# Patient Record
Sex: FEMALE | Race: WHITE | Employment: FULL TIME | URBAN - METROPOLITAN AREA
[De-identification: names, ages, dates, MRNs, and addresses within clinical notes are randomized per-mention and may not be internally consistent; named-entity substitution may affect disease eponyms.]

---

## 2017-01-25 DIAGNOSIS — F41.1 GENERALIZED ANXIETY DISORDER: ICD-10-CM

## 2017-01-25 RX ORDER — CITALOPRAM 10 MG/1
TABLET ORAL
Qty: 90 TAB | Refills: 2 | Status: SHIPPED | OUTPATIENT
Start: 2017-01-25 | End: 2017-09-22 | Stop reason: SDUPTHER

## 2017-02-01 ENCOUNTER — OFFICE VISIT (OUTPATIENT)
Dept: OBGYN CLINIC | Age: 52
End: 2017-02-01

## 2017-02-01 ENCOUNTER — HOSPITAL ENCOUNTER (OUTPATIENT)
Dept: LAB | Age: 52
Discharge: HOME | End: 2017-02-01
Payer: OTHER GOVERNMENT

## 2017-02-01 VITALS
SYSTOLIC BLOOD PRESSURE: 128 MMHG | HEART RATE: 70 BPM | WEIGHT: 120 LBS | BODY MASS INDEX: 21.26 KG/M2 | DIASTOLIC BLOOD PRESSURE: 84 MMHG | HEIGHT: 63 IN

## 2017-02-01 DIAGNOSIS — Z01.419 ENCOUNTER FOR GYNECOLOGICAL EXAMINATION WITHOUT ABNORMAL FINDING: Primary | ICD-10-CM

## 2017-02-01 LAB
BILIRUB UR QL STRIP: NEGATIVE
GLUCOSE UR-MCNC: NEGATIVE MG/DL
KETONES P FAST UR STRIP-MCNC: NEGATIVE MG/DL
PH UR STRIP: 7.5 [PH] (ref 4.6–8)
PROT UR QL STRIP: NEGATIVE MG/DL
SP GR UR STRIP: 1.02 (ref 1–1.03)
UA UROBILINOGEN AMB POC: NORMAL (ref 0.2–1)
URINALYSIS CLARITY POC: CLEAR
URINALYSIS COLOR POC: YELLOW
URINE BLOOD POC: NORMAL
URINE LEUKOCYTES POC: NEGATIVE
URINE NITRITES POC: NEGATIVE

## 2017-02-01 PROCEDURE — 87624 HPV HI-RISK TYP POOLED RSLT: CPT | Performed by: OBSTETRICS & GYNECOLOGY

## 2017-02-01 PROCEDURE — 88175 CYTOPATH C/V AUTO FLUID REDO: CPT | Performed by: OBSTETRICS & GYNECOLOGY

## 2017-02-01 NOTE — MR AVS SNAPSHOT
Visit Information Date & Time Provider Department Dept. Phone Encounter #  
 2/1/2017  9:00 1024 [facility], [address] [place] [address] [phone] [id number] Upcoming Health Maintenance Date Due Hepatitis C Screening 1965 COLONOSCOPY 5/26/1981 BREAST CANCER SCRN MAMMOGRAM 5/26/2013 PAP AKA CERVICAL CYTOLOGY 7/18/2016 Allergies as of 2/1/2017  Review Complete On: 2/1/2017 By: Oleg Avalos DO Severity Noted Reaction Type Reactions Amoxicillin  07/18/2013   Side Effect Nausea and Vomiting Codeine  07/18/2013   Side Effect Rash Percodan [Oxycodone Hcl-oxycodone-asa]  07/18/2013   Side Effect Itching Vicodin [Hydrocodone-acetaminophen]  07/18/2013   Side Effect Itching Current Immunizations  Reviewed on 8/29/2016 Name Date Influenza Vaccine 10/20/2014  9:00 AM  
 Influenza Vaccine (Quad) PF 8/29/2016 Pneumococcal Conjugate (PCV-13) 8/29/2016 Not reviewed this visit You Were Diagnosed With   
  
 Codes Comments Encounter for gynecological examination without abnormal finding    -  Primary ICD-10-CM: F87.984 ICD-9-CM: V72.31 Vitals BP Pulse Height(growth percentile) Weight(growth percentile) LMP BMI  
 128/84 70 5' 3\" (1.6 m) 120 lb (54.4 kg) 01/04/2016 21.26 kg/m2 OB Status Smoking Status Unknown Never Smoker BMI and BSA Data Body Mass Index Body Surface Area  
 21.26 kg/m 2 1.56 m 2 Preferred Pharmacy Pharmacy Name Phone [facility] 5454 [phone] Your Updated Medication List  
  
   
This list is accurate as of: 2/1/17  9:55 AM.  Always use your most recent med list. ADVIL 200 mg tablet Generic drug:  ibuprofen Take  by mouth. cetirizine 10 mg tablet Commonly known as:  ZYRTEC Take 1 Tab by mouth daily. cholecalciferol (VITAMIN D3) 5,000 unit Tab tablet Commonly known as:  VITAMIN D3 Take  by mouth daily. citalopram 10 mg tablet Commonly known as:  CELEXA  
TAKE 1 TABLET DAILY * estrogen (conjugated)-medroxyPROGESTERone 0.625-2.5 mg per tablet Commonly known as:  Tio Soja Take 1 Tab by mouth daily. * estrogen (conjugated)-medroxyPROGESTERone 0.625-2.5 mg per tablet Commonly known as:  Tio Soja Take 1 Tab by mouth daily. multivitamin with iron tablet Take 1 Tab by mouth daily. PROAIR HFA 90 mcg/actuation inhaler Generic drug:  albuterol PROBIOTIC 4X 10-15 mg Tbec Generic drug:  B.infantis-B.ani-B.long-B.bifi Take  by mouth. * Notice: This list has 2 medication(s) that are the same as other medications prescribed for you. Read the directions carefully, and ask your doctor or other care provider to review them with you. Prescriptions Sent to Pharmacy Refills  
 estrogen, conjugated,-medroxyPROGESTERone (PREMPRO) 0.625-2.5 mg per tablet 3 Sig: Take 1 Tab by mouth daily. Class: Normal  
 Pharmacy: [place] [id number] Ph #: [phone] Route: Oral  
 estrogen, conjugated,-medroxyPROGESTERone (PREMPRO) 0.625-2.5 mg per tablet 0 Sig: Take 1 Tab by mouth daily. Class: Normal  
 Pharmacy: [place] [id number] Ph #: [phone] Route: Oral  
  
To-Do List   
 02/01/2017 Imaging:  ROC MAMMO BI SCREENING INCL CAD Introducing [facility]! Dear Laci Cortes: Thank you for requesting a [employer] account. Our records indicate that you already have an active [employer] account. You can access your account anytime at [email or web address] Did you know that you can access your hospital and ER discharge instructions at any time in [employer]? You can also review all of your test results from your hospital stay or ER visit. Additional Information If you have questions, please visit the Frequently Asked Questions section of the [employer] website at [email or web address] Remember, [email or web address] is NOT to be used for urgent needs. For medical emergencies, dial 911. Now available from your iPhone and Android! Please provide this summary of care documentation to your next provider. Your primary care clinician is listed as Ade Forman. If you have any questions after today's visit, please call [phone].

## 2017-02-01 NOTE — PROGRESS NOTES
Subjective:   46 y.o. female for Well Woman Check. Patient's last menstrual period was 2016. Social History: single partner, contraception - none. Pertinent past medical hstory: no history of HTN, DVT, CAD, DM, liver disease, migraines or smoking. Current Outpatient Prescriptions   Medication Sig Dispense Refill    citalopram (CELEXA) 10 mg tablet TAKE 1 TABLET DAILY 90 Tab 2    cetirizine (ZYRTEC) 10 mg tablet Take 1 Tab by mouth daily. 30 Tab 1    PROAIR HFA 90 mcg/actuation inhaler   2    estrogen, conjugated,-medroxyPROGESTERone (PREMPHASE) 0.625 mg (14)/ 0.625mg-5mg(14) per tablet Take 1 Tab by mouth daily. 1 Package 12    cholecalciferol, VITAMIN D3, (VITAMIN D3) 5,000 unit tab tablet Take  by mouth daily.  ibuprofen (ADVIL) 200 mg tablet Take  by mouth.  multivitamin with iron tablet Take 1 Tab by mouth daily.  B.infantis-B.ani-B.long-B.bifi (PROBIOTIC 4X) 10-15 mg TbEC Take  by mouth. Allergies   Allergen Reactions    Amoxicillin Nausea and Vomiting    Codeine Rash    Percodan [Oxycodone Hcl-Oxycodone-Asa] Itching    Vicodin [Hydrocodone-Acetaminophen] Itching     Past Medical History   Diagnosis Date    Graves disease      Past Surgical History   Procedure Laterality Date    Pr abdomen surgery proc unlisted      Endoscopy, colon, diagnostic  2013    Hx  section  ,     Hx dilation and curettage  [id number]     8 miscarriages    Hx pelvic laparoscopy  [id number]     fertitliy    Hx  section       Family History   Problem Relation Age of Onset    SLE Mother     Hypertension Father     High Cholesterol Father      Social History   Substance Use Topics    Smoking status: Never Smoker    Smokeless tobacco: Never Used    Alcohol use Yes      Comment: 2 glasses wine twice a month        ROS:  Feeling well. No dyspnea or chest pain on exertion. No abdominal pain, change in bowel habits, black or bloody stools.   No urinary tract symptoms. GYN ROS: no breast pain or new or enlarging lumps on self exam, no side effects of hormonal medications, no discharge or pelvic pain, no hot flashes. No neurological complaints. Objective:     Visit Vitals    /84    Pulse 70    Ht 5' 3\" (1.6 m)    Wt 120 lb (54.4 kg)    LMP 01/04/2016    BMI 21.26 kg/m2     The patient appears well, alert, oriented x 3, in no distress. ENT normal.  Neck supple. No adenopathy or thyromegaly. AMALIA. Lungs are clear, good air entry, no wheezes, rhonchi or rales. S1 and S2 normal, no murmurs, regular rate and rhythm. Abdomen soft without tenderness, guarding, mass or organomegaly. Extremities show no edema, normal peripheral pulses. Neurological is normal, no focal findings.     BREAST EXAM: breasts appear normal, no suspicious masses, no skin or nipple changes or axillary nodes    PELVIC EXAM: normal external genitalia, vulva, vagina, cervix, uterus and adnexa, PAP: Pap smear done today, HPV test    Assessment/Plan:   well woman  no contraindication to continue hormonal therapy  mammogram  pap smear  return annually or prn

## 2017-02-15 DIAGNOSIS — J45.20 REACTIVE AIRWAY DISEASE, MILD INTERMITTENT, UNCOMPLICATED: ICD-10-CM

## 2017-02-15 RX ORDER — CETIRIZINE HCL 10 MG
10 TABLET ORAL DAILY
Qty: 30 TAB | Refills: 1 | Status: SHIPPED | OUTPATIENT
Start: 2017-02-15 | End: 2017-04-04 | Stop reason: SDUPTHER

## 2017-03-02 ENCOUNTER — TELEPHONE (OUTPATIENT)
Dept: FAMILY MEDICINE CLINIC | Age: 52
End: 2017-03-02

## 2017-04-03 ENCOUNTER — TELEPHONE (OUTPATIENT)
Dept: OBGYN CLINIC | Age: 52
End: 2017-04-03

## 2017-04-03 NOTE — TELEPHONE ENCOUNTER
LM for patient Dr. Olivares Beat called pharmacy and she has refills they just have to order more tonight so she can  tomorrow.

## 2017-04-03 NOTE — TELEPHONE ENCOUNTER
Patient experiencing hot flashes. She is out of her medication. [facility].  Patient states her meds were [facility] because [facility]'t [facility] the [facility].

## 2017-04-03 NOTE — TELEPHONE ENCOUNTER
Patient stated her prescription isn't at [facility] when [facility] changed it they didn't do it for the a year just for that one month

## 2017-04-07 ENCOUNTER — HOSPITAL ENCOUNTER (OUTPATIENT)
Dept: MAMMOGRAPHY | Age: 52
Discharge: HOME | End: 2017-04-07
Attending: OBSTETRICS & GYNECOLOGY
Payer: OTHER GOVERNMENT

## 2017-04-07 DIAGNOSIS — Z01.419 ENCOUNTER FOR GYNECOLOGICAL EXAMINATION WITHOUT ABNORMAL FINDING: ICD-10-CM

## 2017-04-07 PROCEDURE — 77067 SCR MAMMO BI INCL CAD: CPT

## 2017-09-22 DIAGNOSIS — F41.1 GENERALIZED ANXIETY DISORDER: ICD-10-CM

## 2017-09-22 RX ORDER — CITALOPRAM 10 MG/1
TABLET ORAL
Qty: 90 TAB | Refills: 2 | Status: SHIPPED | OUTPATIENT
Start: 2017-09-22

## 2017-09-26 ENCOUNTER — CLINICAL SUPPORT (OUTPATIENT)
Dept: FAMILY MEDICINE CLINIC | Age: 52
End: 2017-09-26

## 2017-09-26 VITALS — HEIGHT: 63 IN

## 2017-09-26 DIAGNOSIS — Z23 ENCOUNTER FOR IMMUNIZATION: Primary | ICD-10-CM

## 2017-09-26 NOTE — PROGRESS NOTES
Andre Stauffer is a 45 y.o. female who presents for routine immunizations. She denies any symptoms , reactions or allergies that would exclude them from being immunized today. Risks and adverse reactions were discussed and the VIS was given to them. All questions were addressed. She was observed for 15 min post injection. There were no reactions observed.     Verbal order with read back per Dr. Dave Paniagua request    Cameron Devi LPN

## 2017-09-26 NOTE — MR AVS SNAPSHOT
Visit Information Date & Time Provider Department Dept. Phone Encounter #  
 9/26/2017  3:45 PM [facility], [address] [place] [id number] Follow-up Instructions Return if symptoms worsen or fail to improve. Follow-up and Disposition History Upcoming Health Maintenance Date Due Hepatitis C Screening 1965 COLONOSCOPY 5/26/1981 Pneumococcal 19-64 Medium Risk (1 of 1 - PPSV23) 5/26/1982 DTaP/Tdap/Td series (1 - Tdap) 5/26/1984 INFLUENZA AGE 9 TO ADULT 8/1/2017 BREAST CANCER SCRN MAMMOGRAM 4/7/2019 PAP AKA CERVICAL CYTOLOGY 2/1/2020 Allergies as of 9/26/2017  Review Complete On: 2/1/2017 By: Georgina Kovacs DO Severity Noted Reaction Type Reactions Amoxicillin  07/18/2013   Side Effect Nausea and Vomiting Codeine  07/18/2013   Side Effect Rash Percodan [Oxycodone Hcl-oxycodone-asa]  07/18/2013   Side Effect Itching Vicodin [Hydrocodone-acetaminophen]  07/18/2013   Side Effect Itching Current Immunizations  Reviewed on 8/29/2016 Name Date Influenza Vaccine 10/20/2014  9:00 AM  
 Influenza Vaccine (Quad) PF 9/26/2017, 8/29/2016 Pneumococcal Conjugate (PCV-13) 8/29/2016 Not reviewed this visit You Were Diagnosed With   
  
 Codes Comments Encounter for immunization    -  Primary ICD-10-CM: Y12 ICD-9-CM: V03.89 Vitals Height(growth percentile) OB Status Smoking Status 5' 3\" (1.6 m) Unknown Never Smoker Preferred Pharmacy Pharmacy Name Phone [address] [facility] [phone] Your Updated Medication List  
  
   
This list is accurate as of: 9/26/17  4:23 PM.  Always use your most recent med list. ADVIL 200 mg tablet Generic drug:  ibuprofen Take  by mouth. cetirizine 10 mg tablet Commonly known as:  ZYRTEC  
TAKE 1 TABLET DAILY cholecalciferol (VITAMIN D3) 5,000 unit Tab tablet Commonly known as:  VITAMIN D3 Take  by mouth daily. citalopram 10 mg tablet Commonly known as:  CELEXA  
TAKE 1 TABLET DAILY * estrogen (conjugated)-medroxyPROGESTERone 0.625-2.5 mg per tablet Commonly known as:  Kathline Pickles Take 1 Tab by mouth daily. * estrogen (conjugated)-medroxyPROGESTERone 0.625-2.5 mg per tablet Commonly known as:  Kathline Pickles Take 1 Tab by mouth daily. multivitamin with iron tablet Take 1 Tab by mouth daily. PROAIR HFA 90 mcg/actuation inhaler Generic drug:  albuterol PROBIOTIC 4X 10-15 mg Tbec Generic drug:  B.infantis-B.ani-B.long-B.bifi Take  by mouth. * Notice: This list has 2 medication(s) that are the same as other medications prescribed for you. Read the directions carefully, and ask your doctor or other care provider to review them with you. We Performed the Following INFLUENZA VIRUS VAC QUAD,SPLIT,PRESV FREE SYRINGE IM [id number] CPT(R)] Follow-up Instructions Return if symptoms worsen or fail to improve. Introducing [facility]! Dear Lucy Friend: Thank you for requesting a [employer] account. Our records indicate that you already have an active [employer] account. You can access your account anytime at [email or web address] Did you know that you can access your hospital and ER discharge instructions at any time in [employer]? You can also review all of your test results from your hospital stay or ER visit. Additional Information If you have questions, please visit the Frequently Asked Questions section of the [employer] website at [email or web address] Remember, [employer] is NOT to be used for urgent needs. For medical emergencies, dial 911. Now available from your iPhone and Android! Please provide this summary of care documentation to your next provider. Your primary care clinician is listed as Kadie Baum. If you have any questions after today's visit, please call [phone].

## 2018-06-19 DIAGNOSIS — F41.1 GENERALIZED ANXIETY DISORDER: ICD-10-CM

## 2018-06-21 RX ORDER — CITALOPRAM 10 MG/1
TABLET ORAL
Qty: 90 TAB | Refills: 2
Start: 2018-06-21

## 2018-07-03 RX ORDER — CONJUGATED ESTROGENS AND MEDROXYPROGESTERONE ACETATE .625; 2.5 MG/1; MG/1
TABLET, SUGAR COATED ORAL
Qty: 28 TAB | Refills: 0 | Status: SHIPPED | OUTPATIENT
Start: 2018-07-03